# Patient Record
Sex: FEMALE | Race: WHITE | Employment: FULL TIME | URBAN - METROPOLITAN AREA
[De-identification: names, ages, dates, MRNs, and addresses within clinical notes are randomized per-mention and may not be internally consistent; named-entity substitution may affect disease eponyms.]

---

## 2018-01-24 ENCOUNTER — HOSPITAL ENCOUNTER (OUTPATIENT)
Dept: GENERAL RADIOLOGY | Age: 58
Discharge: HOME | End: 2018-01-24
Attending: INTERNAL MEDICINE
Payer: COMMERCIAL

## 2018-01-24 DIAGNOSIS — R52 PAIN: ICD-10-CM

## 2018-01-24 PROCEDURE — 73130 X-RAY EXAM OF HAND: CPT

## 2018-02-13 ENCOUNTER — HOSPITAL ENCOUNTER (OUTPATIENT)
Dept: MAMMOGRAPHY | Age: 58
Discharge: HOME | End: 2018-02-13
Attending: INTERNAL MEDICINE
Payer: COMMERCIAL

## 2018-02-13 DIAGNOSIS — Z12.31 VISIT FOR SCREENING MAMMOGRAM: ICD-10-CM

## 2018-02-13 PROCEDURE — 77063 BREAST TOMOSYNTHESIS BI: CPT

## 2018-05-01 ENCOUNTER — HOSPITAL ENCOUNTER (OUTPATIENT)
Dept: GENERAL RADIOLOGY | Age: 58
Discharge: HOME | End: 2018-05-01
Payer: COMMERCIAL

## 2018-05-01 DIAGNOSIS — R05.9 COUGH: ICD-10-CM

## 2018-05-01 PROCEDURE — 71046 X-RAY EXAM CHEST 2 VIEWS: CPT

## 2018-05-09 ENCOUNTER — OFFICE VISIT (OUTPATIENT)
Dept: SURGERY | Age: 58
End: 2018-05-09

## 2018-05-09 VITALS
HEIGHT: 61 IN | WEIGHT: 201 LBS | DIASTOLIC BLOOD PRESSURE: 80 MMHG | BODY MASS INDEX: 37.95 KG/M2 | HEART RATE: 80 BPM | RESPIRATION RATE: 20 BRPM | SYSTOLIC BLOOD PRESSURE: 156 MMHG | TEMPERATURE: 96.9 F

## 2018-05-09 DIAGNOSIS — I10 ESSENTIAL HYPERTENSION: ICD-10-CM

## 2018-05-09 DIAGNOSIS — K21.9 GASTROESOPHAGEAL REFLUX DISEASE WITHOUT ESOPHAGITIS: ICD-10-CM

## 2018-05-09 DIAGNOSIS — E66.01 MORBID OBESITY (HCC): Primary | ICD-10-CM

## 2018-05-09 RX ORDER — FLUOCINOLONE ACETONIDE 0.25 MG/G
CREAM TOPICAL
Refills: 0 | COMMUNITY
Start: 2018-04-02 | End: 2018-05-09 | Stop reason: ALTCHOICE

## 2018-05-09 RX ORDER — PREDNISONE 10 MG/1
TABLET ORAL
Refills: 0 | COMMUNITY
Start: 2018-04-25 | End: 2018-05-09 | Stop reason: ALTCHOICE

## 2018-05-09 RX ORDER — ATENOLOL 50 MG/1
TABLET ORAL
Refills: 0 | COMMUNITY
Start: 2018-02-01

## 2018-05-09 RX ORDER — TRAMADOL HYDROCHLORIDE 50 MG/1
TABLET ORAL
Refills: 0 | COMMUNITY
Start: 2018-05-03

## 2018-05-09 RX ORDER — LEVOCETIRIZINE DIHYDROCHLORIDE 5 MG/1
TABLET, FILM COATED ORAL
COMMUNITY
End: 2018-06-15

## 2018-05-09 RX ORDER — HYDROGEN PEROXIDE 3 %
SOLUTION, NON-ORAL MISCELLANEOUS DAILY
COMMUNITY

## 2018-05-09 RX ORDER — BISMUTH SUBSALICYLATE 262 MG
1 TABLET,CHEWABLE ORAL DAILY
COMMUNITY

## 2018-05-09 RX ORDER — SIMVASTATIN 20 MG/1
TABLET, FILM COATED ORAL
Refills: 0 | COMMUNITY
Start: 2018-04-02

## 2018-05-09 RX ORDER — DOXYCYCLINE 100 MG/1
CAPSULE ORAL
Refills: 0 | COMMUNITY
Start: 2018-04-25 | End: 2018-05-09 | Stop reason: ALTCHOICE

## 2018-05-09 RX ORDER — CHLORTHALIDONE 25 MG/1
TABLET ORAL
Refills: 0 | COMMUNITY
Start: 2018-04-02

## 2018-05-09 NOTE — PROGRESS NOTES
Roseline Tanner is a 65 y.o. female who presents today with   Chief Complaint   Patient presents with    Morbid Obesity     consult        Body mass index is 37.98 kg/(m^2). 1. Have you been to the ER, urgent care clinic since your last visit? Hospitalized since your last visit? No    2. Have you seen or consulted any other health care providers outside of the [address] [place] [address] since your last visit? Include any pap smears or colon screening.  No

## 2018-05-09 NOTE — PROGRESS NOTES
Initial Consultation for Bariatric Surgery Template (Gastric Sleeve)    Julianne Perez is a 65 y.o. female who comes into the office today for initial consultation for the surgical options for the treatment of morbid obesity. The patient initially identified obesity at the age of 56 and at age 26 weighed 112 lbs. She has tried a variety of unsupervised weight-loss attempts including Weight Watchers, but has yet to meet with lasting success. Maximum weight lost on a diet is about 30 lbs, but that the weight loss always seems to return. Today, the patient is  Height: 5' 1\" (154.9 cm) tall, Weight: 91.2 kg (201 lb) lbs for a Body mass index is 37.98 kg/(m^2). It is due to the patient's severe obesity, which is further complicated by hypertension, hyperlipidemia, GERD, stress urinary incontinence and weight related arthopathies  that the patient is now seeking out bariatric surgery, specifically, Sleeve gastrectomy. Past Medical History:   Diagnosis Date    Diabetes ([facility] [address]     Pre DM    Hypercholesterolemia     Hypertension        Past Surgical History:   Procedure Laterality Date    HX ORTHOPAEDIC Right     foot fx    HX PARTIAL HYSTERECTOMY      HX TUBAL LIGATION         Current Outpatient Prescriptions   Medication Sig Dispense Refill    atenolol (TENORMIN) 50 mg tablet   0    chlorthalidone (HYGROTEN) 25 mg tablet   0    simvastatin (ZOCOR) 20 mg tablet   0    traMADol (ULTRAM) 50 mg tablet take 1 tablet by mouth three times a day if needed  0    MILK THISTLE PO Take  by mouth.  multivitamin (ONE A DAY) tablet Take 1 Tab by mouth daily.  FLAXSEED PO Take  by mouth.  esomeprazole (NEXIUM) 20 mg capsule Take  by mouth daily.  levocetirizine (XYZAL) 5 mg tablet Take  by mouth. No Known Allergies    Social History   Substance Use Topics    Smoking status: Current Every Day Smoker    Smokeless tobacco: Never Used    Alcohol use Yes      Comment: wine       History reviewed.  No pertinent family history. Family Status   Relation Status    Mother Other    Father        Review of Systems:  Positive in BOLD    CONST: Fever, weight loss, fatigue or chills  GI: Nausea, vomiting, abdominal pain, change in bowel habits, hematochezia, melena, and GERD   INTEG: Dermatitis, abnormal moles  HEENT: Recent changes in vision, vertigo, epistaxis, dysphagia and hoarseness  CV: Chest pain, palpitations, HTN, edema and varicosities  RESP: Cough, shortness of breath, wheezing, hemoptysis, snoring and reactive airway disease  : Hematuria, dysuria, frequency, urgency, nocturia and stress urinary incontinence   MS: Weakness, joint pain and arthritis - feet  ENDO: Pre-diabetes, thyroid disease, polyuria, polydipsia, polyphagia, poor wound healing, heat intolerance, cold intolerance  LYMPH/HEME: Anemia, bruising and history of blood transfusions  NEURO: Dizziness, headache, fainting, seizures and stroke  PSYCH: Anxiety and depression      Physical Exam    Visit Vitals    /80 (BP 1 Location: Left arm, BP Patient Position: At rest)    Pulse 80    Temp 96.9 °F (36.1 °C) (Oral)    Resp 20    Ht 5' 1\" (1.549 m)    Wt 91.2 kg (201 lb)    BMI 37.98 kg/m2       Pre op weight: 201  EBW: 79  Wt loss to date: 0       General: 65 y.o.) female in no acute distress. Morbidly obese in breasts, hips, thighs - android pattern  HEENT: Normocephalic, atraumatic, Pupils equal and reactive, nasopharynx clear, oropharynx clear and moist without lesions  NECK: Supple, no lymphadenopathy, thyromegaly, carotid bruits or jugular venous distension. trachea midline  RESP: Clear to auscultation bilaterally, no wheezes, rhonchi, or rales, normal respiratory excursion  CV: Regular rate and rhythm, no murmurs, rubs or gallops. 3+/4 pulses in bilateral dorsalis pedis and posterior tibialis. No distal edema or varicosities.   ABD: Soft, nontender, nondistended, normoactive bowel sounds, no hernias, no hepatosplenomegaly, easily palpable costal margins, android distribution  Extremities: Warm, well perfused, no tenderness or swelling, normal gait/station  Neuro: Sensation and strength grossly intact and symmetrical  Psych: Alert and oriented to person, place, and time. Impression:    Bety Jamison is a 65 y.o. female who is suffering from morbid obesity with a BMI of 38  and comorbidities including hypertension, hyperlipidemia, GERD, stress urinary incontinence and weight related arthopathies  who would benefit from bariatric surgery. Unfortunately, she is actively smoking at this time and is therefore, not a candidate until she quits smoking for at least 3 months. We have had an extensive discussion with regard to the risks, benefits and likely outcomes of the operation. We've discussed the restrictive and malabsorptive nature of the gastric bypass and compared and contrasted with the sleeve gastrectomy. The patient understands the likelihood of losing approximately 80% of their excess weight in 12 to 18 months. She also understands the risks including but not limited to bleeding, infection, need for reoperation, ulcers, leaks and strictures, bowel obstruction secondary to adhesions and internal hernias, DVT, PE, heart attack, stroke, and death. Patient also understands risks of inadequate weight loss, excess weight loss, vitamin insufficiency, protein malnutrition, excess skin, and loss of hair. We have reviewed the components of a successful postoperative course including requirement for a high protein, low carbohydrate diet, 60 oz a day of zero calorie liquids, daily vitamin supplementation, daily exercise, regular follow-up, and participation in support groups.  At this time we will enroll the patient in our bariatric program, undertake routine laboratory evaluation, chest X-ray, EKG, possible UGI and evaluation by  nutritionist as well as psychologist and pending their satisfactory completion of the preop evaluation, plan to perform a sleeve gastrectomy.

## 2018-05-09 NOTE — LETTER
5/9/2018 2:03 PM 
 
Patient: Laura Portillo YOB: 1960 Date of Visit: 5/9/2018 Noe Chakraborty MD 
[facility] [address] [facility] [id number] VIA Facsimile: [phone] Dear Noe Chakraborty MD, Thank you for referring Ms. Laura Portillo to [facility] for evaluation and treatment. Below are the relevant portions of my assessment and plan of care. Initial Consultation for Bariatric Surgery Template (Gastric Sleeve) Laura Portillo is a 65 y.o. female who comes into the office today for initial consultation for the surgical options for the treatment of morbid obesity. The patient initially identified obesity at the age of 56 and at age 26 weighed 112 lbs. She has tried a variety of unsupervised weight-loss attempts including Weight Watchers, but has yet to meet with lasting success. Maximum weight lost on a diet is about 30 lbs, but that the weight loss always seems to return. Today, the patient is  Height: 5' 1\" (154.9 cm) tall, Weight: 91.2 kg (201 lb) lbs for a Body mass index is 37.98 kg/(m^2). It is due to the patient's severe obesity, which is further complicated by hypertension, hyperlipidemia, GERD, stress urinary incontinence and weight related arthopathies  that the patient is now seeking out bariatric surgery, specifically, Sleeve gastrectomy. Past Medical History:  
Diagnosis Date  Diabetes ([address] Pre DM  Hypercholesterolemia  Hypertension Past Surgical History:  
Procedure Laterality Date  HX ORTHOPAEDIC Right   
 foot fx  HX PARTIAL HYSTERECTOMY  HX TUBAL LIGATION Current Outpatient Prescriptions Medication Sig Dispense Refill  atenolol (TENORMIN) 50 mg tablet   0  
 chlorthalidone (HYGROTEN) 25 mg tablet   0  
 simvastatin (ZOCOR) 20 mg tablet   0  
 traMADol (ULTRAM) 50 mg tablet take 1 tablet by mouth three times a day if needed  0  
  MILK THISTLE PO Take  by mouth.  multivitamin (ONE A DAY) tablet Take 1 Tab by mouth daily.  FLAXSEED PO Take  by mouth.  esomeprazole (NEXIUM) 20 mg capsule Take  by mouth daily.  levocetirizine (XYZAL) 5 mg tablet Take  by mouth. No Known Allergies Social History Substance Use Topics  Smoking status: Current Every Day Smoker  Smokeless tobacco: Never Used  Alcohol use Yes Comment: wine History reviewed. No pertinent family history. Family Status Relation Status  Mother Other  Father  Review of Systems:  Positive in BOLD 
 
CONST: Fever, weight loss, fatigue or chills GI: Nausea, vomiting, abdominal pain, change in bowel habits, hematochezia, melena, and GERD INTEG: Dermatitis, abnormal moles HEENT: Recent changes in vision, vertigo, epistaxis, dysphagia and hoarseness CV: Chest pain, palpitations, HTN, edema and varicosities RESP: Cough, shortness of breath, wheezing, hemoptysis, snoring and reactive airway disease : Hematuria, dysuria, frequency, urgency, nocturia and stress urinary incontinence MS: Weakness, joint pain and arthritis - feet ENDO: Pre-diabetes, thyroid disease, polyuria, polydipsia, polyphagia, poor wound healing, heat intolerance, cold intolerance LYMPH/HEME: Anemia, bruising and history of blood transfusions NEURO: Dizziness, headache, fainting, seizures and stroke PSYCH: Anxiety and depression Physical Exam 
 
Visit Vitals  /80 (BP 1 Location: Left arm, BP Patient Position: At rest)  Pulse 80  Temp 96.9 °F (36.1 °C) (Oral)  Resp 20  
 Ht 5' 1\" (1.549 m)  Wt 91.2 kg (201 lb)  BMI 37.98 kg/m2 Pre op weight: 201 EBW: 79 Wt loss to date: 0 General: 65 y.o.) female in no acute distress. Morbidly obese in breasts, hips, thighs - android pattern HEENT: Normocephalic, atraumatic, Pupils equal and reactive, nasopharynx clear, oropharynx clear and moist without lesions NECK: Supple, no lymphadenopathy, thyromegaly, carotid bruits or jugular venous distension. trachea midline RESP: Clear to auscultation bilaterally, no wheezes, rhonchi, or rales, normal respiratory excursion CV: Regular rate and rhythm, no murmurs, rubs or gallops. 3+/4 pulses in bilateral dorsalis pedis and posterior tibialis. No distal edema or varicosities. ABD: Soft, nontender, nondistended, normoactive bowel sounds, no hernias, no hepatosplenomegaly, easily palpable costal margins, android distribution Extremities: Warm, well perfused, no tenderness or swelling, normal gait/station Neuro: Sensation and strength grossly intact and symmetrical 
Psych: Alert and oriented to person, place, and time. Impression: Zoila Underwood is a 65 y.o. female who is suffering from morbid obesity with a BMI of 38  and comorbidities including hypertension, hyperlipidemia, GERD, stress urinary incontinence and weight related arthopathies  who would benefit from bariatric surgery. Unfortunately, she is actively smoking at this time and is therefore, not a candidate until she quits smoking for at least 3 months. We have had an extensive discussion with regard to the risks, benefits and likely outcomes of the operation. We've discussed the restrictive and malabsorptive nature of the gastric bypass and compared and contrasted with the sleeve gastrectomy. The patient understands the likelihood of losing approximately 80% of their excess weight in 12 to 18 months. She also understands the risks including but not limited to bleeding, infection, need for reoperation, ulcers, leaks and strictures, bowel obstruction secondary to adhesions and internal hernias, DVT, PE, heart attack, stroke, and death. Patient also understands risks of inadequate weight loss, excess weight loss, vitamin insufficiency, protein malnutrition, excess skin, and loss of hair.   We have reviewed the components of a successful postoperative course including requirement for a high protein, low carbohydrate diet, 60 oz a day of zero calorie liquids, daily vitamin supplementation, daily exercise, regular follow-up, and participation in support groups. At this time we will enroll the patient in our bariatric program, undertake routine laboratory evaluation, chest X-ray, EKG, possible UGI and evaluation by  nutritionist as well as psychologist and pending their satisfactory completion of the preop evaluation, plan to perform a sleeve gastrectomy. Thank you very much for your referral of Ms. Meg Aguillon. If you have questions, please do not hesitate to call me. I look forward to following Ms. Trevizo along with you and will keep you updated as to her progress.   
 
 
 
 
Sincerely, 
 
 
Morelia Tompkins MD

## 2018-05-09 NOTE — COMMUNICATION BODY
Initial Consultation for Bariatric Surgery Template (Gastric Sleeve)    Deloras Duverney is a 65 y.o. female who comes into the office today for initial consultation for the surgical options for the treatment of morbid obesity. The patient initially identified obesity at the age of 56 and at age 26 weighed 112 lbs. She has tried a variety of unsupervised weight-loss attempts including Weight Watchers, but has yet to meet with lasting success. Maximum weight lost on a diet is about 30 lbs, but that the weight loss always seems to return. Today, the patient is  Height: 5' 1\" (154.9 cm) tall, Weight: 91.2 kg (201 lb) lbs for a Body mass index is 37.98 kg/(m^2). It is due to the patient's severe obesity, which is further complicated by hypertension, hyperlipidemia, GERD, stress urinary incontinence and weight related arthopathies  that the patient is now seeking out bariatric surgery, specifically, Sleeve gastrectomy. Past Medical History:   Diagnosis Date    Diabetes ([facility] [address]     Pre DM    Hypercholesterolemia     Hypertension        Past Surgical History:   Procedure Laterality Date    HX ORTHOPAEDIC Right     foot fx    HX PARTIAL HYSTERECTOMY      HX TUBAL LIGATION         Current Outpatient Prescriptions   Medication Sig Dispense Refill    atenolol (TENORMIN) 50 mg tablet   0    chlorthalidone (HYGROTEN) 25 mg tablet   0    simvastatin (ZOCOR) 20 mg tablet   0    traMADol (ULTRAM) 50 mg tablet take 1 tablet by mouth three times a day if needed  0    MILK THISTLE PO Take  by mouth.  multivitamin (ONE A DAY) tablet Take 1 Tab by mouth daily.  FLAXSEED PO Take  by mouth.  esomeprazole (NEXIUM) 20 mg capsule Take  by mouth daily.  levocetirizine (XYZAL) 5 mg tablet Take  by mouth. No Known Allergies    Social History   Substance Use Topics    Smoking status: Current Every Day Smoker    Smokeless tobacco: Never Used    Alcohol use Yes      Comment: wine       History reviewed.  No pertinent family history. Family Status   Relation Status    Mother Other    Father        Review of Systems:  Positive in BOLD    CONST: Fever, weight loss, fatigue or chills  GI: Nausea, vomiting, abdominal pain, change in bowel habits, hematochezia, melena, and GERD   INTEG: Dermatitis, abnormal moles  HEENT: Recent changes in vision, vertigo, epistaxis, dysphagia and hoarseness  CV: Chest pain, palpitations, HTN, edema and varicosities  RESP: Cough, shortness of breath, wheezing, hemoptysis, snoring and reactive airway disease  : Hematuria, dysuria, frequency, urgency, nocturia and stress urinary incontinence   MS: Weakness, joint pain and arthritis - feet  ENDO: Pre-diabetes, thyroid disease, polyuria, polydipsia, polyphagia, poor wound healing, heat intolerance, cold intolerance  LYMPH/HEME: Anemia, bruising and history of blood transfusions  NEURO: Dizziness, headache, fainting, seizures and stroke  PSYCH: Anxiety and depression      Physical Exam    Visit Vitals    /80 (BP 1 Location: Left arm, BP Patient Position: At rest)    Pulse 80    Temp 96.9 °F (36.1 °C) (Oral)    Resp 20    Ht 5' 1\" (1.549 m)    Wt 91.2 kg (201 lb)    BMI 37.98 kg/m2       Pre op weight: 201  EBW: 79  Wt loss to date: 0       General: 65 y.o.) female in no acute distress. Morbidly obese in breasts, hips, thighs - android pattern  HEENT: Normocephalic, atraumatic, Pupils equal and reactive, nasopharynx clear, oropharynx clear and moist without lesions  NECK: Supple, no lymphadenopathy, thyromegaly, carotid bruits or jugular venous distension. trachea midline  RESP: Clear to auscultation bilaterally, no wheezes, rhonchi, or rales, normal respiratory excursion  CV: Regular rate and rhythm, no murmurs, rubs or gallops. 3+/4 pulses in bilateral dorsalis pedis and posterior tibialis. No distal edema or varicosities.   ABD: Soft, nontender, nondistended, normoactive bowel sounds, no hernias, no hepatosplenomegaly, easily palpable costal margins, android distribution  Extremities: Warm, well perfused, no tenderness or swelling, normal gait/station  Neuro: Sensation and strength grossly intact and symmetrical  Psych: Alert and oriented to person, place, and time. Impression:    Laura Portillo is a 65 y.o. female who is suffering from morbid obesity with a BMI of 38  and comorbidities including hypertension, hyperlipidemia, GERD, stress urinary incontinence and weight related arthopathies  who would benefit from bariatric surgery. Unfortunately, she is actively smoking at this time and is therefore, not a candidate until she quits smoking for at least 3 months. We have had an extensive discussion with regard to the risks, benefits and likely outcomes of the operation. We've discussed the restrictive and malabsorptive nature of the gastric bypass and compared and contrasted with the sleeve gastrectomy. The patient understands the likelihood of losing approximately 80% of their excess weight in 12 to 18 months. She also understands the risks including but not limited to bleeding, infection, need for reoperation, ulcers, leaks and strictures, bowel obstruction secondary to adhesions and internal hernias, DVT, PE, heart attack, stroke, and death. Patient also understands risks of inadequate weight loss, excess weight loss, vitamin insufficiency, protein malnutrition, excess skin, and loss of hair. We have reviewed the components of a successful postoperative course including requirement for a high protein, low carbohydrate diet, 60 oz a day of zero calorie liquids, daily vitamin supplementation, daily exercise, regular follow-up, and participation in support groups.  At this time we will enroll the patient in our bariatric program, undertake routine laboratory evaluation, chest X-ray, EKG, possible UGI and evaluation by  nutritionist as well as psychologist and pending their satisfactory completion of the preop evaluation, plan to perform a sleeve gastrectomy.

## 2018-05-22 ENCOUNTER — DOCUMENTATION ONLY (OUTPATIENT)
Dept: SURGERY | Age: 58
End: 2018-05-22

## 2018-05-22 NOTE — PROGRESS NOTES
[place] [address] [facility]  [address] [facility], [facility]    Patient's Name: All Butler                                  Age: 65 y.o. YOB: 1960                                          Sex: female  Date: 05/16/2018  Insurance: [facility]                          Session: 1 of 3               Surgeon:  Dr. Courtney Chavarria    Height: 5'1\"                    Weight: 199#           Starting weight with surgeon: 201#          Do you smoke?: patient did not answer. Alcohol Intake:    I drink occasionally:x  Number of drinks at a time:  1  Number of times a week: 1    Class Guidelines    Pt. Understand that if they miss a month, depending on insurance, they may have to start over. Pt. Also understand that the expectation is to lose  Or maintain weight. Weight gain may result in delaying surgery until the weight is off. EATING HABITS AND BEHAVIORS:  Pt. Struggles With:  Liquid calories:   Skipping breakfast: x  Eating foods with a high amount of carbohydrates: x  Eating High fat foods:   Eating large portions:   Making poor snack choices:x  Eating out frequently:   Skipping meals: x  Reading labels: x  Drinking >48 oz fluids daily:   Getting sufficient physical activity/exercise: x  Night time eating/snacking: xs  Binge eating:   Eating often, even when not hungry (grazing): x  Giving into peer pressure regarding eating/drinking:   Other:     Each class we spend time discussing the pre-op diet, diet progression and vitamin/mineral requirements as well as the Key Diet Principles. Each class we also cover lowering carbohydrate consumption. Keeping carbohydrates <25 grams per meal and avoiding added sugars is emphasized. Patient is educated on the effects that  carbohydrates and bad fats have on them. PHYSICAL ACTIVITY/EXERCISE:   Patient's activity is increasing because patient plans to or is:  Walking more:    Other aerobic activity such as running, swimming, Lesia, kickboxing ect.:   Chair exercises: Active in resistance training such as weight lifting:   Other:     Each class we spend time discussing the importance of increasing activity. Patient can start with 10 minutes of walking daily or chair exercises and build from there. BEHAVIOR MODIFICATION:  Making modifications to behavior, patient plans to or is:  Eating only when hungry not to treat stress, anger, tiredness or boredom:   Eating away from TV, computer and phone:   Eating on a small plate:   Eats slowly, chewing food well: Other: We spend time in each class discussing the importance of breaking bad habits and how to do this. I encourage pt.s to self evaluate and look for those crucial moments in which they are making these poor choices. I recommend a food journal which can help identify when/where//why/who we are with when we compromise and make exceptions that are poor choices. ADDITIONAL INFORMATION:  Specific changes patient made since the last class:  1st visit.     Danica Del Angel RD  05/16/2018

## 2018-05-23 ENCOUNTER — HOSPITAL ENCOUNTER (OUTPATIENT)
Dept: GENERAL RADIOLOGY | Age: 58
Discharge: HOME | End: 2018-05-23
Attending: SURGERY
Payer: COMMERCIAL

## 2018-05-23 DIAGNOSIS — E66.01 MORBID OBESITY (HCC): ICD-10-CM

## 2018-05-23 DIAGNOSIS — I10 ESSENTIAL HYPERTENSION: ICD-10-CM

## 2018-05-23 DIAGNOSIS — K21.9 GASTROESOPHAGEAL REFLUX DISEASE WITHOUT ESOPHAGITIS: ICD-10-CM

## 2018-05-23 PROCEDURE — 74011000255 HC RX REV CODE- 255: Performed by: SURGERY

## 2018-05-23 PROCEDURE — 74247 XR UPPER GI W KUB AIR CONT: CPT

## 2018-05-23 PROCEDURE — 74011000250 HC RX REV CODE- 250: Performed by: SURGERY

## 2018-05-23 RX ADMIN — BARIUM SULFATE 135 ML: 980 POWDER, FOR SUSPENSION ORAL at 10:25

## 2018-05-23 RX ADMIN — ANTACID/ANTIFLATULENT 4 G: 380; 550; 10; 10 GRANULE, EFFERVESCENT ORAL at 10:26

## 2018-05-23 RX ADMIN — BARIUM SULFATE 176 G: 960 POWDER, FOR SUSPENSION ORAL at 10:26

## 2018-05-23 RX ADMIN — ANTACID/ANTIFLATULENT 4 G: 380; 550; 10; 10 GRANULE, EFFERVESCENT ORAL at 10:28

## 2018-05-23 RX ADMIN — ANTACID/ANTIFLATULENT 4 G: 380; 550; 10; 10 GRANULE, EFFERVESCENT ORAL at 10:29

## 2018-06-15 ENCOUNTER — OFFICE VISIT (OUTPATIENT)
Dept: SURGERY | Age: 58
End: 2018-06-15

## 2018-06-15 VITALS
BODY MASS INDEX: 38.1 KG/M2 | TEMPERATURE: 97.2 F | HEIGHT: 61 IN | SYSTOLIC BLOOD PRESSURE: 164 MMHG | RESPIRATION RATE: 18 BRPM | WEIGHT: 201.8 LBS | HEART RATE: 67 BPM | OXYGEN SATURATION: 97 % | DIASTOLIC BLOOD PRESSURE: 82 MMHG

## 2018-06-15 DIAGNOSIS — E66.01 MORBID OBESITY (HCC): Primary | ICD-10-CM

## 2018-06-15 DIAGNOSIS — Z87.891 HX OF TOBACCO USE, PRESENTING HAZARDS TO HEALTH: ICD-10-CM

## 2018-06-15 DIAGNOSIS — K21.9 GASTROESOPHAGEAL REFLUX DISEASE WITHOUT ESOPHAGITIS: ICD-10-CM

## 2018-06-15 DIAGNOSIS — I10 ESSENTIAL HYPERTENSION: ICD-10-CM

## 2018-06-15 DIAGNOSIS — E66.01 MORBID OBESITY (HCC): ICD-10-CM

## 2018-06-15 DIAGNOSIS — Z01.818 PREOP EXAMINATION: ICD-10-CM

## 2018-06-15 RX ORDER — MELATONIN 5 MG
TABLET, SUBLINGUAL SUBLINGUAL
COMMUNITY

## 2018-06-15 NOTE — PROGRESS NOTES
1. Have you been to the ER, urgent care clinic since your last visit? Hospitalized since your last visit? No    2. Have you seen or consulted any other health care providers outside of the [address] [place] [address] since your last visit? Include any pap smears or colon screening. Shannan     Rodo Alicia is a 65 y.o. female who presents today for a bariatric mid trial surgical evaluation to assess their progress towards having bariatric surgery. Patient's BMI today is Body mass index is 38.13 kg/(m^2). Yariel Coleman

## 2018-06-15 NOTE — PATIENT INSTRUCTIONS
If you have any questions or concerns about today's appointment, the verbal and/or written instructions you were given for follow up care, please call our office at [phone].     [facility] - [address]    [phone] office  [phone]

## 2018-06-27 ENCOUNTER — DOCUMENTATION ONLY (OUTPATIENT)
Dept: SURGERY | Age: 58
End: 2018-06-27

## 2018-06-27 NOTE — PROGRESS NOTES
[place] [address] [facility]  [address] [facility], [address] [place]    Patient's Name: Joanne Cummings                                  Age: 65 y.o. YOB: 1960                                          Sex: female  Date: 06/13/2018  Insurance: [facility]                          Session: 2 of 4               Surgeon:  Dr. Cormier     Height: 5'1\"                    Weight: 203#           Starting weight with surgeon: 201#          Do you smoke?: no    Alcohol Intake:    I drink occasionally:x  Number of drinks at a time:  1-2  Number of times a week: 1    Class Guidelines    Pt. Understand that if they miss a month, depending on insurance, they may have to start over. Pt. Also understand that the expectation is to lose  Or maintain weight. Weight gain may result in delaying surgery until the weight is off. EATING HABITS AND BEHAVIORS:  Pt. Struggles With:  Liquid calories:   Skipping breakfast: x  Eating foods with a high amount of carbohydrates:   Eating High fat foods:   Eating large portions:   Making poor snack choices:  Eating out frequently:   Skipping meals: x  Reading labels:   Drinking >48 oz fluids daily:   Getting sufficient physical activity/exercise:   Night time eating/snacking:   Binge eating:   Eating often, even when not hungry (grazing):   Giving into peer pressure regarding eating/drinking:   Other:     Each class we spend time discussing the pre-op diet, diet progression and vitamin/mineral requirements as well as the Key Diet Principles. Each class we also cover lowering carbohydrate consumption. Keeping carbohydrates <25 grams per meal and avoiding added sugars is emphasized. Patient is educated on the effects that  carbohydrates and bad fats have on them.       PHYSICAL ACTIVITY/EXERCISE:   Patient's activity is increasing because patient plans to or is:  Walking more: x  Other aerobic activity such as running, swimming, Lesia, kickboxing ect.:   Chair exercises: Active in resistance training such as weight lifting:   Other:     Each class we spend time discussing the importance of increasing activity. Patient can start with 10 minutes of walking daily or chair exercises and build from there. BEHAVIOR MODIFICATION:  Making modifications to behavior, patient plans to or is:  Eating only when hungry not to treat stress, anger, tiredness or boredom: x  Eating away from TV, computer and phone:   Eating on a small plate: x  Eats slowly, chewing food well: x  Other: We spend time in each class discussing the importance of breaking bad habits and how to do this. I encourage pt.s to self evaluate and look for those crucial moments in which they are making these poor choices. I recommend a food journal which can help identify when/where//why/who we are with when we compromise and make exceptions that are poor choices. ADDITIONAL INFORMATION:  Specific changes patient made since the last class:  Quit smoking. RD's concerns/opinion's:  Patient shares that she quit smoking with seems to make her want to eat more.     Clemencia Coley RD  06/13/2018

## 2018-07-09 NOTE — PROGRESS NOTES
Bariatric Preoperative Progress note:    Subjective: Reagan Chauhan is a 65 y.o. female who presents today for followup of their candidacy for bariatric surgery. Since last seen, Reagan Chauhan has been working through bariatric program towards [facility]. It is due to the patient's severe obesity, which is further complicated by hypertension, hyperlipidemia, GERD, stress urinary incontinence and weight related arthopathies  that the patient is now seeking out bariatric surgery. Past Medical History:   Diagnosis Date    Anemia NEC     Depression     Diabetes ([facility] [address]     Pre DM    Hypercholesterolemia     Hypertension     age 53    Hypertension     Menopause     Ulcerative colitis ([facility] [address]     last flare spring 2007 led to diagnosis. Past Surgical History:   Procedure Laterality Date    HX APPENDECTOMY  2007    HX HYSTERECTOMY  2007    HX ORTHOPAEDIC      right foot, pin to 3rd bone of foot, pin is now removed.  HX ORTHOPAEDIC Right     foot fx    HX PARTIAL HYSTERECTOMY      HX SALPINGO-OOPHORECTOMY  2007    left     HX TUBAL LIGATION         Current Outpatient Prescriptions   Medication Sig Dispense Refill    melatonin 5 mg subl by SubLINGual route.  atenolol (TENORMIN) 50 mg tablet   0    chlorthalidone (HYGROTEN) 25 mg tablet   0    simvastatin (ZOCOR) 20 mg tablet   0    traMADol (ULTRAM) 50 mg tablet take 1 tablet by mouth three times a day if needed  0    MILK THISTLE PO Take  by mouth.  multivitamin (ONE A DAY) tablet Take 1 Tab by mouth daily.  FLAXSEED PO Take  by mouth.  esomeprazole (NEXIUM) 20 mg capsule Take  by mouth daily.  traMADol (ULTRAM) 50 mg tablet Take 1 tablet by mouth every six (6) hours as needed for Pain. 30 tablet 0    atenolol-chlorthalidone (TENORETIC) 50-25 mg per tablet Take 1 tablet by mouth daily. 90 tablet 1    Omeprazole delayed release (PRILOSEC D/R) 20 mg tablet Take 1 tablet by mouth daily.  30 tablet 0       No Known Allergies    ROS:  Review of Systems   Constitutional: Negative. HENT: Negative. Cardiovascular: Negative. Gastrointestinal: Positive for heartburn. Negative for abdominal pain, nausea and vomiting. Genitourinary:        ANGELINA    Musculoskeletal: Positive for joint pain. Neurological: Negative. Objective:     Physical Exam:  Visit Vitals    /82    Pulse 67    Temp 97.2 °F (36.2 °C) (Oral)    Resp 18    Ht 5' 1\" (1.549 m)    Wt 91.5 kg (201 lb 12.8 oz)    SpO2 97%    BMI 38.13 kg/m2       Physical Exam    Studies to date:    Labs: not done, advised to get August based on her smoking quit date     EKG: not done     Nutritional evaluation: 1/3 WLT, due to finish July     Psychiatric evaluation: not done     UGI: Ok for sleeve per AUGUSTIN Mcclain     Assessment:   Sascha Contreras is a 65 y.o. female who is progressing through the bariatric preoperative evaluation. At this time, they are not at this time an appropriate candidate for weight loss surgery due to the lack to preop evaluation that she has completed. Plan:   -encouraged continued smoking cessation, pt understands she will be tested for nicotine and is required to be 3mo smoke free in order to be eligable for WLS. -pt communicates understanding that the expectation is to lose or maintain weight during WLT. Weight gain may result in delaying surgery.   -complete remainder of preop evaluation including WLT, labs, psych clearance, EKG, and support group.   -Follow up once has completed entirety of weight loss workup to determine next steps.        >50% of 20 min visit spent counseling     NALLELY Fuentes-BC

## 2018-07-12 ENCOUNTER — DOCUMENTATION ONLY (OUTPATIENT)
Dept: SURGERY | Age: 58
End: 2018-07-12

## 2018-07-12 NOTE — PROGRESS NOTES
[place] [address] [facility]  [address] [facility], [facility]    Patient's Name: Reagan Chauhan                                  Age: 65 y.o. YOB: 1960                                          Sex: female  Date: 07/11/2018  Insurance: [facility]                          Session: 3 of 3               Surgeon:  Dr. Monik Null    Height: 5'1\"                    Weight: 200#           Starting weight with surgeon: 201#          Do you smoke?: Patient shares she had a few cigarettes on the 4th of July with a glass of wine. Patient has not had any other cigarettes since her meeting with the surgeon. Alcohol Intake:    I drink occasionally:x  Number of drinks at a time:  2  Number of times a week: 1    Class Guidelines    Pt. Understand that if they miss a month, depending on insurance, they may have to start over. Pt. Also understand that the expectation is to lose  Or maintain weight. Weight gain may result in delaying surgery until the weight is off. EATING HABITS AND BEHAVIORS:  Pt. Struggles With:  Liquid calories:   Skipping breakfast: x  Eating foods with a high amount of carbohydrates:   Eating High fat foods:   Eating large portions:   Making poor snack choices:x  Eating out frequently:   Skipping meals: x  Reading labels:   Drinking >48 oz fluids daily:   Getting sufficient physical activity/exercise:   Night time eating/snacking:   Binge eating:   Eating often, even when not hungry (grazing):   Giving into peer pressure regarding eating/drinking:   Other:     Each class we spend time discussing the pre-op diet, diet progression and vitamin/mineral requirements as well as the Key Diet Principles. Each class we also cover lowering carbohydrate consumption. Keeping carbohydrates <25 grams per meal and avoiding added sugars is emphasized. Patient is educated on the effects that  carbohydrates and bad fats have on them.       PHYSICAL ACTIVITY/EXERCISE:   Patient's activity is increasing because patient plans to or is:  Walking more: x  Other aerobic activity such as running, swimming, Lesia, kickboxing ect.:   Chair exercises: Active in resistance training such as weight lifting:   Other:     Each class we spend time discussing the importance of increasing activity. Patient can start with 10 minutes of walking daily or chair exercises and build from there. BEHAVIOR MODIFICATION:  Making modifications to behavior, patient plans to or is:  Eating only when hungry not to treat stress, anger, tiredness or boredom: x  Eating away from TV, computer and phone: x  Eating on a small plate: x  Eats slowly, chewing food well: x  Other: We spend time in each class discussing the importance of breaking bad habits and how to do this. I encourage pt.s to self evaluate and look for those crucial moments in which they are making these poor choices. I recommend a food journal which can help identify when/where//why/who we are with when we compromise and make exceptions that are poor choices. ADDITIONAL INFORMATION:  Specific changes patient made since the last class:  Eating breakfast more often. Snacking on more vegetables. RD's concerns/opinion's:  Patient has completed her WLT and has been educated on the pre-op, post-op diet protocol. Vitamin/mineral/protein supplements have also been covered. Patient completed a quiz with accuracy. She is cleared to proceed from a nutritional perspective.     Arben Tavares RD  07/11/2018

## 2019-05-02 ENCOUNTER — HOSPITAL ENCOUNTER (OUTPATIENT)
Dept: MAMMOGRAPHY | Age: 59
Discharge: HOME | End: 2019-05-02
Attending: INTERNAL MEDICINE
Payer: COMMERCIAL

## 2019-05-02 DIAGNOSIS — Z12.31 VISIT FOR SCREENING MAMMOGRAM: ICD-10-CM

## 2019-05-02 PROCEDURE — 77063 BREAST TOMOSYNTHESIS BI: CPT

## 2020-07-28 ENCOUNTER — HOSPITAL ENCOUNTER (OUTPATIENT)
Dept: MAMMOGRAPHY | Age: 60
Discharge: HOME | End: 2020-07-28
Attending: INTERNAL MEDICINE
Payer: COMMERCIAL

## 2020-07-28 DIAGNOSIS — Z12.31 VISIT FOR SCREENING MAMMOGRAM: ICD-10-CM

## 2020-07-28 PROCEDURE — 77063 BREAST TOMOSYNTHESIS BI: CPT

## 2020-08-22 ENCOUNTER — HOSPITAL ENCOUNTER (EMERGENCY)
Age: 60
Discharge: HOME | End: 2020-08-22
Attending: EMERGENCY MEDICINE
Payer: COMMERCIAL

## 2020-08-22 ENCOUNTER — APPOINTMENT (OUTPATIENT)
Dept: GENERAL RADIOLOGY | Age: 60
End: 2020-08-22
Attending: PHYSICIAN ASSISTANT
Payer: COMMERCIAL

## 2020-08-22 VITALS
WEIGHT: 192 LBS | HEART RATE: 83 BPM | RESPIRATION RATE: 16 BRPM | DIASTOLIC BLOOD PRESSURE: 72 MMHG | OXYGEN SATURATION: 98 % | HEIGHT: 61 IN | BODY MASS INDEX: 36.25 KG/M2 | SYSTOLIC BLOOD PRESSURE: 150 MMHG | TEMPERATURE: 98.2 F

## 2020-08-22 DIAGNOSIS — M79.671 FOOT PAIN, BILATERAL: ICD-10-CM

## 2020-08-22 DIAGNOSIS — S99.911A INJURY OF RIGHT ANKLE, INITIAL ENCOUNTER: ICD-10-CM
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
## 2020-08-22 DIAGNOSIS — M79.672 FOOT PAIN, BILATERAL: ICD-10-CM

## 2020-08-22 DIAGNOSIS — S99.912A INJURY OF LEFT ANKLE, INITIAL ENCOUNTER: ICD-10-CM

## 2020-08-22 PROCEDURE — 73630 X-RAY EXAM OF FOOT: CPT

## 2020-08-22 PROCEDURE — 99282 EMERGENCY DEPT VISIT SF MDM: CPT

## 2020-08-22 PROCEDURE — 73610 X-RAY EXAM OF ANKLE: CPT

## 2020-08-22 RX ORDER — ACETAMINOPHEN 500 MG
1000 TABLET ORAL
Status: DISCONTINUED | OUTPATIENT
Start: 2020-08-22 | End: 2020-08-22 | Stop reason: HOSPADM

## 2020-08-22 RX ORDER — ACETAMINOPHEN AND CODEINE PHOSPHATE 300; 30 MG/1; MG/1
1 TABLET ORAL
Qty: 12 TAB | Refills: 0 | Status: SHIPPED | OUTPATIENT
Start: 2020-08-22 | End: 2020-08-25

## 2020-08-22 RX ORDER — KETOROLAC TROMETHAMINE 15 MG/ML
15 INJECTION, SOLUTION INTRAMUSCULAR; INTRAVENOUS
Status: DISCONTINUED | OUTPATIENT
Start: 2020-08-22 | End: 2020-08-22 | Stop reason: HOSPADM

## 2020-08-22 NOTE — ED TRIAGE NOTES
Patient c/o fall. Pt states she missed a step and fell. Denies hitting head and LOC. Pain on ankles and feet.

## 2020-08-22 NOTE — ED PROVIDER NOTES
EMERGENCY DEPARTMENT HISTORY AND PHYSICAL EXAM    Date: 8/22/2020  Patient Name: Jovanni George    History of Presenting Illness     Chief Complaint   Patient presents with    Fall         History Provided By: Patient    Chief Complaint: Fall, bilateral ankle and foot pain  Duration: Yesterday   Timing: Constant  Location: Bilateral ankle and foot, worse on the right than left  Quality: Aching  Severity: Moderate to severe  Modifying Factors: Worse when trying to go to work today  Associated Symptoms: none       Additional History (Context): Jovanni George is a 63 y.o. female with a history of hypertension who presents today for history as listed above. Patient reports yesterday she stumbled on her kitchen steps and fell down to. Denies hitting her head or LOC. 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 Reports she is concerned for fracture. States she is able to ambulate on both feet however is very painful. Has been trying tramadol at home without relief. PCP: Annika Ragsdale MD    Current Facility-Administered Medications   Medication Dose Route Frequency Provider Last Rate Last Dose    acetaminophen (TYLENOL) tablet 1,000 mg  1,000 mg Oral NOW Alayna Warren PA        ketorolac (TORADOL) injection 15 mg  15 mg IntraVENous NOW Alayna Warren, [place]         Current Outpatient Medications   Medication Sig Dispense Refill    acetaminophen-codeine (Tylenol-Codeine #3) 300-30 mg per tablet Take 1 Tab by mouth every four (4) hours as needed for Pain for up to 3 days. Max Daily Amount: 6 Tabs. 12 Tab 0    melatonin 5 mg subl by SubLINGual route.       atenolol (TENORMIN) 50 mg tablet   0    chlorthalidone (HYGROTEN) 25 mg tablet   0    simvastatin (ZOCOR) 20 mg tablet   0    traMADol (ULTRAM) 50 mg tablet take 1 tablet by mouth three times a day if needed  0    MILK THISTLE PO Take by mouth.  multivitamin (ONE A DAY) tablet Take 1 Tab by mouth daily.  FLAXSEED PO Take  by mouth.  esomeprazole (NEXIUM) 20 mg capsule Take  by mouth daily.  traMADol (ULTRAM) 50 mg tablet Take 1 tablet by mouth every six (6) hours as needed for Pain. 30 tablet 0    atenolol-chlorthalidone (TENORETIC) 50-25 mg per tablet Take 1 tablet by mouth daily. 90 tablet 1    Omeprazole delayed release (PRILOSEC D/R) 20 mg tablet Take 1 tablet by mouth daily. 30 tablet 0       Past History     Past Medical History:  Past Medical History:   Diagnosis Date    Anemia NEC     Depression     Diabetes ([facility] [address]     Pre DM    Hypercholesterolemia     Hypertension     age 53    Hypertension     Menopause     Ulcerative colitis ([facility] [address]     last flare spring 2007 led to diagnosis. Past Surgical History:  Past Surgical History:   Procedure Laterality Date    HX APPENDECTOMY  2007    HX HYSTERECTOMY  2007    HX ORTHOPAEDIC      right foot, pin to 3rd bone of foot, pin is now removed.  HX ORTHOPAEDIC Right     foot fx    HX PARTIAL HYSTERECTOMY      HX SALPINGO-OOPHORECTOMY  2007    left     HX TUBAL LIGATION         Family History:  Family History   Problem Relation Age of Onset    Heart Disease Mother     Diabetes Father     Mental Retardation Sister        Social History:  Social History     Tobacco Use    Smoking status: Former Smoker     Packs/day: 0.50     Years: 20.00     Pack years: 10.00     Last attempt to quit: 2018     Years since quittin.2    Smokeless tobacco: Never Used   Substance Use Topics    Alcohol use: Yes     Comment: on weekends.  Drug use: No       Allergies: Allergies   Allergen Reactions    Lisinopril Anaphylaxis         Review of Systems   Review of Systems   Constitutional: Negative for chills and fever. HENT: Negative for congestion, rhinorrhea and sore throat. Respiratory: Negative for cough and shortness of breath.     Cardiovascular: Negative for chest pain. Gastrointestinal: Negative for abdominal pain, blood in stool, constipation, diarrhea, nausea and vomiting. Genitourinary: Negative for dysuria, frequency and hematuria. Musculoskeletal: Positive for arthralgias. Negative for back pain and myalgias. Skin: Negative for rash and wound. Neurological: Negative for dizziness and headaches. All other systems reviewed and are negative. All Other Systems Negative  Physical Exam     Vitals:    08/22/20 1001   BP: 150/72   Pulse: 83   Resp: 16   Temp: 98.2 °F (36.8 °C)   SpO2: 98%   Weight: 87.1 kg (192 lb)   Height: 5' 1\" (1.549 m)     Physical Exam  Vitals signs and nursing note reviewed. Constitutional:       General: She is not in acute distress. Appearance: She is well-developed. She is not diaphoretic. HENT:      Head: Normocephalic and atraumatic. Eyes:      Conjunctiva/sclera: Conjunctivae normal.   Neck:      Musculoskeletal: Normal range of motion and neck supple. Cardiovascular:      Rate and Rhythm: Normal rate and regular rhythm. Heart sounds: Normal heart sounds. Pulmonary:      Effort: Pulmonary effort is normal. No respiratory distress. Breath sounds: Normal breath sounds. Chest:      Chest wall: No tenderness. Abdominal:      General: Bowel sounds are normal. There is no distension. Palpations: Abdomen is soft. Tenderness: There is no abdominal tenderness. There is no guarding or rebound. Musculoskeletal:         General: No deformity. Right ankle: Tenderness (diffuse). Left ankle: She exhibits normal range of motion, no swelling, no laceration and normal pulse. Tenderness. AITFL tenderness found. Comments: Strong DP and PT pulses bilaterally, some mild bruising noted to the left ankle. Skin:     General: Skin is warm and dry. Neurological:      Mental Status: She is alert and oriented to person, place, and time.       Deep Tendon Reflexes: Reflexes are normal and symmetric. Diagnostic Study Results     Labs -   No results found for this or any previous visit (from the past 12 hour(s)). Radiologic Studies -   XR ANKLE RT MIN 3 V    (Results Pending)   XR ANKLE LT MIN 3 V    (Results Pending)   XR FOOT LT MIN 3 V    (Results Pending)   XR FOOT RT MIN 3 V    (Results Pending)     CT Results  (Last 48 hours)    None        CXR Results  (Last 48 hours)    None            Medical Decision Making   I am the first provider for this patient. I reviewed the vital signs, available nursing notes, past medical history, past surgical history, family history and social history. Vital Signs-Reviewed the patient's vital signs. Records Reviewed: Nursing Notes and Old Medical Records     Procedures: None   Procedures    Provider Notes (Medical Decision Making):     Differential: fracture, dislocation, abrasion, sprain, contusion, laceration      Plan: Will order xrays, Toradol and Tylenol    11:43 AM  Have discussed overall reassuring imaging with patient. Will discharge home with 2 Ace wraps and pain medication. Have discussed the importance of rice care for home. Patient is very upset because she noticed her shoes were sitting on top of some dried blood, this issue was immediately resolved and blood was cleaned up. Have apologized multiple times. Will discharge home with orthopedic follow-up. MED RECONCILIATION:  Current Facility-Administered Medications   Medication Dose Route Frequency    acetaminophen (TYLENOL) tablet 1,000 mg  1,000 mg Oral NOW    ketorolac (TORADOL) injection 15 mg  15 mg IntraVENous NOW     Current Outpatient Medications   Medication Sig    acetaminophen-codeine (Tylenol-Codeine #3) 300-30 mg per tablet Take 1 Tab by mouth every four (4) hours as needed for Pain for up to 3 days. Max Daily Amount: 6 Tabs.  melatonin 5 mg subl by SubLINGual route.     atenolol (TENORMIN) 50 mg tablet     chlorthalidone (HYGROTEN) 25 mg tablet  simvastatin (ZOCOR) 20 mg tablet     traMADol (ULTRAM) 50 mg tablet take 1 tablet by mouth three times a day if needed    MILK THISTLE PO Take  by mouth.  multivitamin (ONE A DAY) tablet Take 1 Tab by mouth daily.  FLAXSEED PO Take  by mouth.  esomeprazole (NEXIUM) 20 mg capsule Take  by mouth daily.  traMADol (ULTRAM) 50 mg tablet Take 1 tablet by mouth every six (6) hours as needed for Pain.  atenolol-chlorthalidone (TENORETIC) 50-25 mg per tablet Take 1 tablet by mouth daily.  Omeprazole delayed release (PRILOSEC D/R) 20 mg tablet Take 1 tablet by mouth daily. Disposition:  Home     DISCHARGE NOTE:   Pt has been reexamined. Patient has no new complaints, changes, or physical findings. Care plan outlined and precautions discussed. Results of workup were reviewed with the patient. All medications were reviewed with the patient. All of pt's questions and concerns were addressed. Patient was instructed and agrees to follow up with Ortho as well as to return to the ED upon further deterioration. Patient is ready to go home. Follow-up Information     Follow up With Specialties Details Why Contact [facility] EMERGENCY DEPT Emergency Medicine  As needed [address] [place] [address] [facility] , [facility]  Schedule an appointment as soon as possible for a visit  [address] [place] [id number] [phone]          Current Discharge Medication List      START taking these medications    Details   acetaminophen-codeine (Tylenol-Codeine #3) 300-30 mg per tablet Take 1 Tab by mouth every four (4) hours as needed for Pain for up to 3 days. Max Daily Amount: 6 Tabs. Qty: 12 Tab, Refills: 0    Associated Diagnoses: Fall, initial encounter; Injury of right ankle, initial encounter; Injury of left ankle, initial encounter                 Diagnosis     Clinical Impression:   1.  Fall, initial encounter 2. Injury of right ankle, initial encounter    3. Injury of left ankle, initial encounter    4. Foot pain, bilateral          \"Please note that this dictation was completed with [employer], the computer voice recognition software. Quite often unanticipated grammatical, syntax, homophones, and other interpretive errors are inadvertently transcribed by the computer software. Please disregard these errors. Please excuse any errors that have escaped final proofreading. \"

## 2020-08-22 NOTE — LETTER
[facility] EMERGENCY DEPT 
[address] 
[address] [facility] [address] [phone] Work/School Note Date: 8/22/2020 To Whom It May concern: Anibal Infante was seen and treated today in the emergency room by the following provider(s): 
Attending Provider: Lilly العراقي DO Physician Assistant: BARTOLO Negrete. Anibal Infante may return to work on 8/24/20 Sincerely, BARTOLO Villanueva

## 2020-08-22 NOTE — ED NOTES
Patient in no distress. Patient complained of existing blood spots on the floor. This nurse apologized to patient. Housekeeping has been notified to mop the floor in the room.

## 2020-08-22 NOTE — ED NOTES
Discharge instructions given by PA. Pt left without medications given. Pt walked out the door ambulatory and in no distress.

## 2020-08-22 NOTE — DISCHARGE INSTRUCTIONS
Patient Education        Learning About RICE (Rest, Ice, Compression, and Elevation)  What is RICE? RICE is a way to care for an injury. RICE helps relieve pain and swelling. It may also help with healing and flexibility. RICE stands for:  · R est and protect the injured or sore area. · I ce or a cold pack used as soon as possible. · C ompression, or wrapping the injured or sore area with an elastic bandage. · E levation (propping up) the injured or sore area. How do you do RICE? You can use RICE for home treatment when you have general aches and pains or after an injury or surgery. Rest  · Do not put weight on the injury for at least 24 to 48 hours. · Use crutches for a badly sprained knee or ankle. · Support a sprained wrist, elbow, or shoulder with a sling. Ice  · Put ice or a cold pack on the injury right away to reduce pain and swelling. Frozen vegetables will also work as an ice pack. Put a thin cloth between the ice or cold pack and your skin. The cloth protects the injured area from getting too cold. · Use ice for 10 to 15 minutes at a time for the first 48 to 72 hours. Compression  · Use compression for sprains, strains, and surgeries of the arms and legs. · Wrap the injured area with an elastic bandage or compression sleeve to reduce swelling. · Don't wrap it too tightly. If the area below it feels numb, tingles, or feels cool, loosen the wrap. Elevation  · Use elevation for areas of the body that can be propped up, such as arms and legs. · Prop up the injured area on pillows whenever you use ice. Keep it propped up anytime you sit or lie down. · Try to keep the injured area at or above the level of your heart. This will help reduce swelling and bruising. Where can you learn more? Go to http://rula-samira.info/  Enter I463 in the search box to learn more about \"Learning About RICE (Rest, Ice, Compression, and Elevation). \"  Current as of: March 2, 2020               Content Version: 12.5  © 7187-6436 [employer]wise, Incorporated. Care instructions adapted under license by [employer] (which disclaims liability or warranty for this information). If you have questions about a medical condition or this instruction, always ask your healthcare professional. [address] any warranty or liability for your use of this information.

## 2021-03-15 ENCOUNTER — TRANSCRIBE ORDER (OUTPATIENT)
Dept: SCHEDULING | Age: 61
End: 2021-03-15

## 2021-03-15 DIAGNOSIS — N95.9 MENOPAUSAL PROBLEM: Primary | ICD-10-CM

## 2022-03-11 ENCOUNTER — TRANSCRIBE ORDER (OUTPATIENT)
Dept: SCHEDULING | Age: 62
End: 2022-03-11

## 2022-03-11 DIAGNOSIS — N95.1 SYMPTOMATIC MENOPAUSAL OR FEMALE CLIMACTERIC STATES: Primary | ICD-10-CM

## 2022-03-11 DIAGNOSIS — Z12.31 OTHER SCREENING MAMMOGRAM: Primary | ICD-10-CM

## 2022-03-19 PROBLEM — I10 ESSENTIAL HYPERTENSION: Status: ACTIVE | Noted: 2018-05-09

## 2022-03-19 PROBLEM — E66.01 MORBID OBESITY (HCC): Status: ACTIVE | Noted: 2018-05-09

## 2022-03-19 PROBLEM — K21.9 GASTROESOPHAGEAL REFLUX DISEASE WITHOUT ESOPHAGITIS: Status: ACTIVE | Noted: 2018-05-09

## 2022-04-13 ENCOUNTER — HOSPITAL ENCOUNTER (OUTPATIENT)
Dept: WOMENS IMAGING | Age: 62
Discharge: HOME | End: 2022-04-13
Attending: INTERNAL MEDICINE
Payer: COMMERCIAL

## 2022-04-13 DIAGNOSIS — Z12.31 OTHER SCREENING MAMMOGRAM: ICD-10-CM

## 2022-04-13 PROCEDURE — 77063 BREAST TOMOSYNTHESIS BI: CPT

## 2022-05-16 ENCOUNTER — HOSPITAL ENCOUNTER (OUTPATIENT)
Dept: BONE DENSITY | Age: 62
Discharge: HOME | End: 2022-05-16
Attending: INTERNAL MEDICINE
Payer: COMMERCIAL

## 2022-05-16 DIAGNOSIS — N95.1 SYMPTOMATIC MENOPAUSAL OR FEMALE CLIMACTERIC STATES: ICD-10-CM

## 2022-05-16 PROCEDURE — 77080 DXA BONE DENSITY AXIAL: CPT
